# Patient Record
Sex: MALE
[De-identification: names, ages, dates, MRNs, and addresses within clinical notes are randomized per-mention and may not be internally consistent; named-entity substitution may affect disease eponyms.]

---

## 2020-10-07 PROBLEM — Z00.00 ENCOUNTER FOR PREVENTIVE HEALTH EXAMINATION: Status: ACTIVE | Noted: 2020-10-07

## 2020-10-12 ENCOUNTER — APPOINTMENT (OUTPATIENT)
Dept: UROLOGY | Facility: CLINIC | Age: 45
End: 2020-10-12
Payer: COMMERCIAL

## 2020-10-12 VITALS
WEIGHT: 200 LBS | SYSTOLIC BLOOD PRESSURE: 143 MMHG | DIASTOLIC BLOOD PRESSURE: 80 MMHG | HEIGHT: 69 IN | BODY MASS INDEX: 29.62 KG/M2

## 2020-10-12 VITALS — TEMPERATURE: 97.4 F

## 2020-10-12 DIAGNOSIS — Z87.891 PERSONAL HISTORY OF NICOTINE DEPENDENCE: ICD-10-CM

## 2020-10-12 DIAGNOSIS — Z78.9 OTHER SPECIFIED HEALTH STATUS: ICD-10-CM

## 2020-10-12 DIAGNOSIS — Z84.1 FAMILY HISTORY OF DISORDERS OF KIDNEY AND URETER: ICD-10-CM

## 2020-10-12 PROCEDURE — 99204 OFFICE O/P NEW MOD 45 MIN: CPT

## 2020-10-12 RX ORDER — NAPROXEN 500 MG/1
500 TABLET ORAL
Qty: 28 | Refills: 0 | Status: ACTIVE | COMMUNITY
Start: 2020-06-15

## 2020-10-12 RX ORDER — CYCLOBENZAPRINE HYDROCHLORIDE 10 MG/1
10 TABLET, FILM COATED ORAL
Qty: 20 | Refills: 0 | Status: ACTIVE | COMMUNITY
Start: 2020-06-15

## 2020-10-12 NOTE — HISTORY OF PRESENT ILLNESS
[FreeTextEntry1] : 45 year old man seen 10/12/2020 with complaint of kidney stones. This began 20 years ago, when he began passing stones. He passed multiple stones then, and even had ESWL. He was asymptomatic for many years. Recently, he started passing stones again. He has some hesitancy with voiding, and then passes stone. No other LUTS, no LUTS when not passing stone.  \par It is mild in severity as there is no flank pain or nausea. Nothing makes the symptoms better, nothing makes sx worse. It is associated with nothing.\par No hematuria, no dysuria, no frequency, no urgency, no hesitancy, no straining. No incontinence. \par No fevers, no chills, no nausea, no vomiting, no flank pain. \par \par Pt reports family history of kidney stones.

## 2020-10-12 NOTE — ASSESSMENT
[FreeTextEntry1] : 44 yo M with history of kidney stones, recently passed a few. Recommend RBUS and KUB to assess current stone burden. If negative, will send for 24 hr urine collection to better discuss stone prevention. Pt understands.

## 2020-10-17 ENCOUNTER — APPOINTMENT (OUTPATIENT)
Dept: RADIOLOGY | Facility: CLINIC | Age: 45
End: 2020-10-17

## 2020-10-17 ENCOUNTER — OUTPATIENT (OUTPATIENT)
Dept: OUTPATIENT SERVICES | Facility: HOSPITAL | Age: 45
LOS: 1 days | End: 2020-10-17
Payer: COMMERCIAL

## 2020-10-17 ENCOUNTER — APPOINTMENT (OUTPATIENT)
Dept: ULTRASOUND IMAGING | Facility: CLINIC | Age: 45
End: 2020-10-17

## 2020-10-17 DIAGNOSIS — N20.0 CALCULUS OF KIDNEY: ICD-10-CM

## 2020-10-17 PROCEDURE — 76775 US EXAM ABDO BACK WALL LIM: CPT | Mod: 26

## 2020-10-17 PROCEDURE — 76770 US EXAM ABDO BACK WALL COMP: CPT | Mod: 26

## 2020-10-17 PROCEDURE — 74018 RADEX ABDOMEN 1 VIEW: CPT | Mod: 26

## 2020-10-17 PROCEDURE — 74018 RADEX ABDOMEN 1 VIEW: CPT

## 2020-10-17 PROCEDURE — 76775 US EXAM ABDO BACK WALL LIM: CPT

## 2020-11-23 ENCOUNTER — APPOINTMENT (OUTPATIENT)
Dept: UROLOGY | Facility: CLINIC | Age: 45
End: 2020-11-23
Payer: COMMERCIAL

## 2020-11-23 VITALS — HEART RATE: 57 BPM | DIASTOLIC BLOOD PRESSURE: 70 MMHG | SYSTOLIC BLOOD PRESSURE: 128 MMHG | TEMPERATURE: 97.2 F

## 2020-11-23 DIAGNOSIS — N20.0 CALCULUS OF KIDNEY: ICD-10-CM

## 2020-11-23 PROCEDURE — 99213 OFFICE O/P EST LOW 20 MIN: CPT

## 2020-11-23 NOTE — HISTORY OF PRESENT ILLNESS
[FreeTextEntry1] : 45 year old man seen 10/12/2020 with complaint of kidney stones. This began 20 years ago, when he began passing stones. He passed multiple stones then, and even had ESWL. He was asymptomatic for many years. Recently, he started passing stones again. He has some hesitancy with voiding, and then passes stone. No other LUTS, no LUTS when not passing stone.  \par It is mild in severity as there is no flank pain or nausea. Nothing makes the symptoms better, nothing makes sx worse. It is associated with nothing.\par No hematuria, no dysuria, no frequency, no urgency, no hesitancy, no straining. No incontinence. \par No fevers, no chills, no nausea, no vomiting, no flank pain. Pt reports family history of kidney stones. \par \par 11/23/2020: Patient presents for follow up. He reports passing stones and has them for analysis today. He denies any  symptoms and other medical  issues remain unchanged from above. No hematuria, no dysuria, no frequency, no urgency, no hesitancy, no straining. No incontinence. No fevers, no chills, no nausea, no vomiting, no flank pain. \par Renal US (10/17/2020): no hydronephrosis, 7 mm and 5 mm RIGHT renal stones, 6 mm and 8 mm LEFT renal stones\par KUB Xray (10/17/2020): RIGHT renal stones, LEFT ureteral radiopaque stones

## 2020-11-23 NOTE — ASSESSMENT
[FreeTextEntry1] : 46 yo M with history of kidney stones, recently passed a few but imagiing shows stones including LEFT ureteral stones. With regards to renal calculi, we discussed several treatment options. We discussed medical expulsive therapy with alpha blocker to aid passage with pain medication and antiemetics for comfort. We also discussed the risks and benefits of ESWL. Benefits include noninvasive treatment and moderate anesthesia requirement, but risks include ineffective lithotripsy, requirement for subsequent procedures, hematoma, and steinstrasse. Finally, we discussed ureteroscopy with benefits including higher clearance rate of stones, direct visualization, concurrent stent placement, and possible stone extraction allowing for stone analysis. Risks include trauma to urethra, bladder, ureter, or kidney, hematoma, infection, ureteral stricture. \par \par Pt unsure how he wants to proceed. Will order RBUS and KUB for 3 months and pt will discuss options with his wife. Recommended Ureteroscopy, but he wants to think about it.

## 2020-11-30 LAB — NIDUS STONE QN: NORMAL

## 2020-12-28 ENCOUNTER — RX CHANGE (OUTPATIENT)
Age: 45
End: 2020-12-28

## 2020-12-28 RX ORDER — TAMSULOSIN HYDROCHLORIDE 0.4 MG/1
0.4 CAPSULE ORAL
Qty: 90 | Refills: 3 | Status: ACTIVE | COMMUNITY
Start: 2020-11-23 | End: 1900-01-01

## 2021-03-08 ENCOUNTER — APPOINTMENT (OUTPATIENT)
Dept: UROLOGY | Facility: CLINIC | Age: 46
End: 2021-03-08